# Patient Record
Sex: FEMALE | Race: WHITE | URBAN - METROPOLITAN AREA
[De-identification: names, ages, dates, MRNs, and addresses within clinical notes are randomized per-mention and may not be internally consistent; named-entity substitution may affect disease eponyms.]

---

## 2019-02-27 ENCOUNTER — HOSPITAL ENCOUNTER (OUTPATIENT)
Age: 29
Setting detail: SPECIMEN
Discharge: HOME | End: 2019-02-27
Payer: COMMERCIAL

## 2019-03-01 LAB — H PYLORI BREATH TEST: NEGATIVE

## 2019-07-26 ENCOUNTER — HOSPITAL ENCOUNTER (OUTPATIENT)
Age: 29
Setting detail: SPECIMEN
Discharge: HOME | End: 2019-07-26
Payer: COMMERCIAL

## 2019-07-27 LAB
DIRECT EXAM: NORMAL
Lab: NORMAL
SPECIMEN DESCRIPTION: NORMAL

## 2019-07-30 LAB
CHLAMYDIA BY THIN PREP: NEGATIVE
CYTOLOGY REPORT: NORMAL
HPV SAMPLE: NORMAL
HPV, GENOTYPE 16: NOT DETECTED
HPV, GENOTYPE 18: NOT DETECTED
HPV, HIGH RISK OTHER: NOT DETECTED
HPV, INTERPRETATION: NORMAL
N. GONORRHOEAE DNA, THIN PREP: NEGATIVE
SPECIMEN DESCRIPTION: NORMAL
SPECIMEN DESCRIPTION: NORMAL

## 2019-09-18 ENCOUNTER — HOSPITAL ENCOUNTER (OUTPATIENT)
Age: 29
Setting detail: SPECIMEN
Discharge: HOME | End: 2019-09-18
Payer: COMMERCIAL

## 2019-09-18 LAB
ESTRADIOL LEVEL: 43 PG/ML (ref 27–314)
FOLLICLE STIMULATING HORMONE: 5.7 U/L (ref 1.7–21.5)
LH: 5.2 U/L (ref 1–95.6)
TESTOSTERONE TOTAL: 8 NG/DL (ref 20–70)

## 2019-09-19 LAB — DHEAS (DHEA SULFATE): 62.4 UG/DL (ref 65–380)

## 2019-11-26 ENCOUNTER — HOSPITAL ENCOUNTER (OUTPATIENT)
Age: 29
Setting detail: SPECIMEN
Discharge: HOME | End: 2019-11-26
Payer: COMMERCIAL

## 2019-11-27 LAB
C TRACH DNA GENITAL QL NAA+PROBE: NEGATIVE
DIRECT EXAM: ABNORMAL
HSV 1, NAAT: NEGATIVE
HSV 2, NAAT: NEGATIVE
Lab: ABNORMAL
N. GONORRHOEAE DNA: NEGATIVE
SPECIMEN DESCRIPTION: ABNORMAL
SPECIMEN DESCRIPTION: NORMAL

## 2020-09-10 VITALS — BODY MASS INDEX: 32.28 KG/M2 | HEIGHT: 66 IN

## 2020-09-10 RX ORDER — MELATONIN 100 %
POWDER (GRAM) MISCELLANEOUS
COMMUNITY
End: 2020-09-28 | Stop reason: SDUPTHER

## 2020-09-28 ENCOUNTER — HOSPITAL ENCOUNTER (OUTPATIENT)
Age: 30
Setting detail: SPECIMEN
Discharge: HOME | End: 2020-09-28
Payer: COMMERCIAL

## 2020-09-28 ENCOUNTER — OFFICE VISIT (OUTPATIENT)
Dept: OBGYN CLINIC | Age: 30
End: 2020-09-28
Payer: COMMERCIAL

## 2020-09-28 VITALS
BODY MASS INDEX: 41.14 KG/M2 | DIASTOLIC BLOOD PRESSURE: 82 MMHG | SYSTOLIC BLOOD PRESSURE: 121 MMHG | WEIGHT: 256 LBS | HEIGHT: 66 IN

## 2020-09-28 PROCEDURE — 99213 OFFICE O/P EST LOW 20 MIN: CPT | Performed by: NURSE PRACTITIONER

## 2020-09-28 RX ORDER — QUETIAPINE FUMARATE 25 MG/1
75 TABLET, FILM COATED ORAL
COMMUNITY
Start: 2019-07-26

## 2020-09-28 RX ORDER — VENLAFAXINE HYDROCHLORIDE 150 MG/1
CAPSULE, EXTENDED RELEASE ORAL
COMMUNITY
Start: 2020-08-30

## 2020-09-28 RX ORDER — ATORVASTATIN CALCIUM 10 MG/1
TABLET, FILM COATED ORAL
COMMUNITY
Start: 2020-06-25

## 2020-09-28 RX ORDER — FAMOTIDINE 20 MG/1
TABLET, FILM COATED ORAL
COMMUNITY
Start: 2020-09-20 | End: 2022-09-20 | Stop reason: ALTCHOICE

## 2020-09-28 RX ORDER — CHOLECALCIFEROL (VITAMIN D3) 125 MCG
CAPSULE ORAL
COMMUNITY
Start: 2020-09-14 | End: 2021-08-30 | Stop reason: DRUGHIGH

## 2020-09-28 RX ORDER — BUSPIRONE HYDROCHLORIDE 5 MG/1
TABLET ORAL
COMMUNITY
Start: 2020-09-04

## 2020-09-28 RX ORDER — CARIPRAZINE 1.5 MG/1
CAPSULE, GELATIN COATED ORAL
COMMUNITY
Start: 2020-09-12 | End: 2020-12-10

## 2020-09-28 RX ORDER — VENLAFAXINE HYDROCHLORIDE 75 MG/1
CAPSULE, EXTENDED RELEASE ORAL
COMMUNITY
Start: 2020-09-23

## 2020-09-28 RX ORDER — ASPIRIN 81 MG/1
TABLET, COATED ORAL
COMMUNITY
Start: 2020-09-20

## 2020-09-28 NOTE — PROGRESS NOTES
PROBLEM VISIT     Date of service: 2020    Tony Cleary  Is a 30 y.o.  female    PT's PCP is: Hero Rizo MD     : 1990                                             Subjective:       Patient's last menstrual period was 2020 (approximate).    OB History    Para Term  AB Living   2 2 2     2   SAB TAB Ectopic Molar Multiple Live Births             2      # Outcome Date GA Lbr Holden/2nd Weight Sex Delivery Anes PTL Lv   2 Term 08 40w0d   F Vag-Spont EPI  HEIDE   1 Term  40w0d       HEIDE        Social History     Tobacco Use   Smoking Status Never Smoker   Smokeless Tobacco Never Used        Social History     Substance and Sexual Activity   Alcohol Use No       Allergies: No known allergies      Current Outpatient Medications:     ASPIRIN LOW DOSE 81 MG EC tablet, TAKE 1 TABLET BY MOUTH EVERY DAY, Disp: , Rfl:     atorvastatin (LIPITOR) 10 MG tablet, TAKE 1 TABLET BY MOUTH EVERY DAY, Disp: , Rfl:     busPIRone (BUSPAR) 5 MG tablet, TAKE 1 TABLET BY MOUTH THREE TIMES A DAY, Disp: , Rfl:     famotidine (PEPCID) 20 MG tablet, TAKE 1 TABLET BY MOUTH TWICE A DAY, Disp: , Rfl:     melatonin 5 MG TABS tablet, TAKE 2 TABLETS BY MOUTH AT BEDTIME, Disp: , Rfl:     VRAYLAR 1.5 MG capsule, TAKE 1 CAPSULE BY MOUTH EVERY DAY, Disp: , Rfl:     QUEtiapine (SEROQUEL) 25 MG tablet, QUEtiapine SEROquel 25MG Oral Tablet 2019 Provider: 2019  [id number] ([id number]), Disp: , Rfl:     venlafaxine (EFFEXOR XR) 150 MG extended release capsule, TAKE 1 CAPSULE EVERY MORNING, Disp: , Rfl:     venlafaxine (EFFEXOR XR) 75 MG extended release capsule, TAKE 1 CAPSULE BY MOUTH EVERY DAY, Disp: , Rfl:     Cariprazine HCl (VRAYLAR PO), Take by mouth, Disp: , Rfl:     raNITIdine HCl ([facility]S RANITIDINE PO), Take by mouth, Disp: , Rfl:     QUEtiapine Fumarate (SEROQUEL PO), Take by mouth, Disp: , Rfl:     Social History     Substance and Sexual Activity   Sexual Activity Yes    Partners: Male       Last Yearly:  8/2020    Last pap: 2019      Chief Complaint   Patient presents with    Follow-up     Needs pap by PCP reccomendations- was not done at annual in August.         PE:  Vital Signs  Blood pressure 121/82, height 5' 6\" (1.676 m), weight 256 lb (116.1 kg), last menstrual period 09/07/2020. 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 This was not reported as part of her history prior. Patient now desires yearly paps, will make note. Desires STD testing. PT denies fever, chills, nausea and vomiting       Review of Systems   Genitourinary: Negative. Objective   Pelvic Exam: GENITAL/URINARY:  External Genitalia:  General appearance; normal, Hair distribution; normal, Lesions absent  Urethra: Fullness absent, Masses absent  Vagina:  General appearance normal, Estrogen effect normal, Discharge absent, Lesions absent, Pelvic support normal  Cervix:  General appearance normal, Lesions absent, Discharge absent, Tenderness absent, Enlargement absent, Nodularity absent, friable                                     Vaginal discharge: no vaginal discharge                        Results reviewed today:    No results found for this visit on 09/28/20. Assessment and Plan          Diagnosis Orders   1. Encounter for gynecological examination (general) (routine) without abnormal findings  PAP SMEAR   2. Screening examination for STD (sexually transmitted disease)  Chlamydia/GC DNA, Thin Prep    VAGINITIS DNA PROBE   3. 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 PCP recommended yearly paps due to increased risk of cancer. I am having Codie CAMARAErasto Evy maintain her Cariprazine HCl (VRAYLAR PO), raNITIdine HCl (CVS RANITIDINE PO), QUEtiapine Fumarate (SEROQUEL PO), Aspirin Low Dose, atorvastatin, busPIRone, famotidine, melatonin, Vraylar, QUEtiapine, venlafaxine, and venlafaxine.     Return in about 11 months (around 8/28/2021) for yearly. There are no Patient Instructions on file for this visit. Over 50% of time spent on counseling and care coordination on: see assessment and plan,  She was also counseled on her preventative health maintenance recommendations and follow-up.         FF time: 15 min      Jaison Caballero,9/28/2020 2:18 PM

## 2020-09-29 LAB
DIRECT EXAM: ABNORMAL
Lab: ABNORMAL
SPECIMEN DESCRIPTION: ABNORMAL

## 2020-09-29 RX ORDER — FLUCONAZOLE 150 MG/1
150 TABLET ORAL ONCE
Qty: 1 TABLET | Refills: 0 | Status: SHIPPED | OUTPATIENT
Start: 2020-09-29 | End: 2020-09-29

## 2020-10-01 LAB
CHLAMYDIA BY THIN PREP: NEGATIVE
N. GONORRHOEAE DNA, THIN PREP: NEGATIVE
SPECIMEN DESCRIPTION: NORMAL

## 2020-10-06 LAB — CYTOLOGY REPORT: NORMAL

## 2020-10-21 ENCOUNTER — TELEPHONE (OUTPATIENT)
Dept: OBGYN CLINIC | Age: 30
End: 2020-10-21

## 2020-10-21 RX ORDER — FLUCONAZOLE 150 MG/1
150 TABLET ORAL ONCE
Qty: 1 TABLET | Refills: 0 | Status: SHIPPED | OUTPATIENT
Start: 2020-10-21 | End: 2020-10-21

## 2020-10-21 NOTE — TELEPHONE ENCOUNTER
Pt calling with continued yeast infection sx. Wondered if you'd be willing send Diflucan in for her. Seeing urologist today to deal with those sx.

## 2020-12-10 ENCOUNTER — OFFICE VISIT (OUTPATIENT)
Dept: OBGYN CLINIC | Age: 30
End: 2020-12-10
Payer: COMMERCIAL

## 2020-12-10 ENCOUNTER — HOSPITAL ENCOUNTER (OUTPATIENT)
Age: 30
Setting detail: SPECIMEN
Discharge: HOME | End: 2020-12-10
Payer: COMMERCIAL

## 2020-12-10 VITALS
HEIGHT: 66 IN | BODY MASS INDEX: 39.21 KG/M2 | SYSTOLIC BLOOD PRESSURE: 118 MMHG | WEIGHT: 244 LBS | DIASTOLIC BLOOD PRESSURE: 80 MMHG

## 2020-12-10 PROCEDURE — 99213 OFFICE O/P EST LOW 20 MIN: CPT | Performed by: OBSTETRICS & GYNECOLOGY

## 2020-12-10 PROCEDURE — G8484 FLU IMMUNIZE NO ADMIN: HCPCS | Performed by: OBSTETRICS & GYNECOLOGY

## 2020-12-10 PROCEDURE — 1036F TOBACCO NON-USER: CPT | Performed by: OBSTETRICS & GYNECOLOGY

## 2020-12-10 PROCEDURE — G8427 DOCREV CUR MEDS BY ELIG CLIN: HCPCS | Performed by: OBSTETRICS & GYNECOLOGY

## 2020-12-10 PROCEDURE — G8417 CALC BMI ABV UP PARAM F/U: HCPCS | Performed by: OBSTETRICS & GYNECOLOGY

## 2020-12-10 RX ORDER — OXYBUTYNIN CHLORIDE 10 MG/1
15 TABLET, EXTENDED RELEASE ORAL DAILY
COMMUNITY
Start: 2020-11-28 | End: 2021-08-30 | Stop reason: DRUGHIGH

## 2020-12-10 RX ORDER — FLUCONAZOLE 150 MG/1
150 TABLET ORAL
Qty: 2 TABLET | Refills: 0 | Status: SHIPPED | OUTPATIENT
Start: 2020-12-10 | End: 2020-12-16

## 2020-12-10 NOTE — PROGRESS NOTES
DATE OF VISIT:  12/10/20    PATIENT NAME:  Claudetta Grant     YOB: 1990    REASON FOR VISIT:    Chief Complaint   Patient presents with    Vaginitis     Pt. c/o vaginal burning, white discharge, and itching internally and externally. Symptoms began approx 1 week ago. HISTORY OF PRESENT ILLNESS:  Pt here with complaint of burning, itching, and thick, clumpy, white discharge - states  is concerned bc he has symptoms after having intercourse; he thinks maybe she has trich -reviewed previous cultures with pt - she has not had this before      No LMP recorded. Vitals:    12/10/20 1332   BP: 118/80   Site: Right Upper Arm   Position: Sitting   Weight: 244 lb (110.7 kg)   Height: 5' 6\" (1.676 m)     Body mass index is 39.38 kg/m². Allergies   Allergen Reactions    No Known Allergies      Current Outpatient Medications   Medication Sig Dispense Refill    ASPIRIN LOW DOSE 81 MG EC tablet TAKE 1 TABLET BY MOUTH EVERY DAY      atorvastatin (LIPITOR) 10 MG tablet TAKE 1 TABLET BY MOUTH EVERY DAY      busPIRone (BUSPAR) 5 MG tablet TAKE 1 TABLET BY MOUTH THREE TIMES A DAY      famotidine (PEPCID) 20 MG tablet TAKE 1 TABLET BY MOUTH TWICE A DAY      melatonin 5 MG TABS tablet TAKE 2 TABLETS BY MOUTH AT BEDTIME      QUEtiapine (SEROQUEL) 25 MG tablet QUEtiapine SEROquel 25MG Oral Tablet 07/26/2019 Provider: 07-  [facility] ([id number])      venlafaxine (EFFEXOR XR) 150 MG extended release capsule TAKE 1 CAPSULE EVERY MORNING      venlafaxine (EFFEXOR XR) 75 MG extended release capsule TAKE 1 CAPSULE BY MOUTH EVERY DAY      Cariprazine HCl (VRAYLAR PO) Take by mouth      raNITIdine HCl (CVS RANITIDINE PO) Take by mouth      oxybutynin (DITROPAN-XL) 10 MG extended release tablet        No current facility-administered medications for this visit.       Social History     Socioeconomic History    Marital status: Single     Spouse name: Not on file    Number of children: Not on file    Years of education: Not on file    Highest education level: Not on file   Occupational History    Not on file   Social Needs    Financial resource strain: Not on file    Food insecurity     Worry: Not on file     Inability: Not on file    Transportation needs     Medical: Not on file     Non-medical: Not on file   Tobacco Use    Smoking status: Never Smoker    Smokeless tobacco: Never Used   Substance and Sexual Activity    Alcohol use: No    Drug use: Yes     Types: Marijuana     Comment: Last use 9/2020    Sexual activity: Yes     Partners: Male   Lifestyle    Physical activity     Days per week: Not on file     Minutes per session: Not on file    Stress: Not on file   Relationships    Social connections     Talks on phone: Not on file     Gets together: Not on file     Attends Congregational service: Not on file     Active member of club or organization: Not on file     Attends meetings of clubs or organizations: Not on file     Relationship status: Not on file    Intimate partner violence     Fear of current or ex partner: Not on file     Emotionally abused: Not on file     Physically abused: Not on file     Forced sexual activity: Not on file   Other Topics Concern    Not on file   Social History Narrative    Not on file       REVIEW OF SYSTEMS:  Review of Systems   Constitutional: Negative for chills and fever. Genitourinary: Positive for vaginal discharge and vaginal pain. Negative for dyspareunia and dysuria. PHYSICAL EXAM:  /80 (Site: Right Upper Arm, Position: Sitting)   Ht 5' 6\" (1.676 m)   Wt 244 lb (110.7 kg)   BMI 39.38 kg/m²   Physical Exam  Constitutional:       Appearance: Normal appearance. Genitourinary:      Pelvic exam was performed with patient in the lithotomy position. Vaginal discharge (thick white) and erythema present. HENT:      Head: Normocephalic and atraumatic.    Eyes:      Extraocular Movements: Extraocular movements intact. Pupils: Pupils are equal, round, and reactive to light. Neck:      Musculoskeletal: Normal range of motion. Cardiovascular:      Rate and Rhythm: Normal rate. Pulmonary:      Effort: Pulmonary effort is normal.   Musculoskeletal: Normal range of motion. Neurological:      Mental Status: She is alert and oriented to person, place, and time. Skin:     General: Skin is warm and dry. Psychiatric:         Mood and Affect: Mood normal.         Behavior: Behavior normal.         Thought Content: Thought content normal.         Judgment: Judgment normal.         ASSESSMENT:      1. Acute vaginitis        PLAN:  No orders of the defined types were placed in this encounter. No follow-ups on file.        Electronically signed by Bright Andrade DO on 12/10/20

## 2020-12-11 LAB
DIRECT EXAM: ABNORMAL
Lab: ABNORMAL
SPECIMEN DESCRIPTION: ABNORMAL

## 2020-12-14 RX ORDER — METRONIDAZOLE 500 MG/1
500 TABLET ORAL 2 TIMES DAILY
Qty: 14 TABLET | Refills: 0 | Status: SHIPPED | OUTPATIENT
Start: 2020-12-14 | End: 2020-12-21

## 2020-12-14 RX ORDER — FLUCONAZOLE 150 MG/1
150 TABLET ORAL ONCE
Qty: 1 TABLET | Refills: 0 | Status: SHIPPED | OUTPATIENT
Start: 2020-12-14 | End: 2020-12-14

## 2020-12-14 NOTE — RESULT ENCOUNTER NOTE
I attempted to call patient with her results and her phone has calling restrictions. The call from the office will not go through. Rxs e-prescribed to the pharmacy. Patient has MyChart and can see her results and pharmacy should notify patient once Rxs are available.

## 2021-05-25 ENCOUNTER — INITIAL CONSULT (OUTPATIENT)
Dept: PULMONOLOGY | Age: 31
End: 2021-05-25
Payer: COMMERCIAL

## 2021-05-25 VITALS
WEIGHT: 253 LBS | SYSTOLIC BLOOD PRESSURE: 118 MMHG | TEMPERATURE: 97.8 F | HEIGHT: 66 IN | OXYGEN SATURATION: 98 % | DIASTOLIC BLOOD PRESSURE: 84 MMHG | BODY MASS INDEX: 40.66 KG/M2 | HEART RATE: 110 BPM

## 2021-05-25 DIAGNOSIS — E66.01 MORBID OBESITY WITH BMI OF 40.0-44.9, ADULT (HCC): ICD-10-CM

## 2021-05-25 DIAGNOSIS — R06.81 WITNESSED APNEIC SPELLS: ICD-10-CM

## 2021-05-25 DIAGNOSIS — R06.89 SLEEP RELATED CHOKING SENSATION: ICD-10-CM

## 2021-05-25 DIAGNOSIS — G47.10 HYPERSOMNIA: Primary | ICD-10-CM

## 2021-05-25 PROCEDURE — G8417 CALC BMI ABV UP PARAM F/U: HCPCS | Performed by: INTERNAL MEDICINE

## 2021-05-25 PROCEDURE — 1036F TOBACCO NON-USER: CPT | Performed by: INTERNAL MEDICINE

## 2021-05-25 PROCEDURE — G8427 DOCREV CUR MEDS BY ELIG CLIN: HCPCS | Performed by: INTERNAL MEDICINE

## 2021-05-25 PROCEDURE — 99203 OFFICE O/P NEW LOW 30 MIN: CPT | Performed by: INTERNAL MEDICINE

## 2021-05-25 NOTE — PATIENT INSTRUCTIONS
Patient Education        Learning About Low-Carbohydrate Diets  What is a low-carbohydrate diet? A low-carbohydrate (or \"low-carb\") diet limits foods and drinks that have carbohydrates. This includes grains, fruits, milk and yogurt, and starchy vegetables like potatoes, beans, and corn. It also avoids foods and drinks that have added sugar. Instead, low-carb diets include foods that are high in protein and fat. Why might you follow a low-carb diet? Low-carb diets may be used for a variety of reasons, such as for weight loss. People who have diabetes may use a low-carb diet to help manage their blood sugar levels. What should you do before you start the diet? Talk to your doctor before you try any diet. This is even more important if you have health problems like kidney disease, heart disease, or diabetes. Your doctor may suggest that you meet with a registered dietitian. A dietitian can help you make an eating plan that works for you. What foods do you eat on a low-carb diet? On a low-carb diet, you choose foods that are high in protein and fat. Examples of these are:  · Meat, poultry, and fish. · Eggs. · Nuts, such as walnuts, pecans, almonds, and peanuts. · Peanut butter and other nut butters. · Tofu. · Avocado. · Afshan Tariq. · Non-starchy vegetables like broccoli, cauliflower, green beans, mushrooms, peppers, lettuce, and spinach. · Unsweetened non-dairy milks like almond milk and coconut milk. · Cheese, cottage cheese, and cream cheese. Current as of: December 17, 2020               Content Version: 12.8  © 2006-2021 [employer]. Care instructions adapted under license by [facility] ([facility]). If you have questions about a medical condition or this instruction, always ask your healthcare professional. [address]  any warranty or liability for your use of this information.          Patient Education        Sleep Apnea: Care Instructions  Overview     Sleep apnea means that you frequently stop breathing for 10 seconds or longer during sleep. It can be mild to severe, based on the number of times an hour that you stop breathing or have slowed breathing. Blocked or narrowed airways in your nose, mouth, or throat can cause sleep apnea. Your airway can become blocked when your throat muscles and tongue relax during sleep. You can help treat sleep apnea at home by making lifestyle changes. You also can use a CPAP breathing machine that keeps tissues in the throat from blocking your airway. Or your doctor may suggest that you use a breathing device while you sleep. It helps keep your airway open. This could be a device that you put in your mouth. In some cases, surgery may be needed to remove enlarged tissues in the throat. Follow-up care is a key part of your treatment and safety. Be sure to make and go to all appointments, and call your doctor if you are having problems. It's also a good idea to know your test results and keep a list of the medicines you take. How can you care for yourself at home? · Lose weight, if needed. · Sleep on your side. It may help mild apnea. · Avoid alcohol and medicines such as sleeping pills, opioids, or sedatives before bed. · Don't smoke. If you need help quitting, talk to your doctor. · Prop up the head of your bed. · Treat breathing problems, such as a stuffy nose, that are caused by a cold or allergies. · Try a continuous positive airway pressure (CPAP) breathing machine if your doctor recommends it. · If CPAP doesn't work for you, ask your doctor if you can try other masks, settings, or breathing machines. · Try oral breathing devices or other nasal devices. · Talk to your doctor if your nose feels dry or bleeds, or if it gets runny or stuffy when you use a breathing machine. · Tell your doctor if you're sleepy during the day and it affects your daily life. Don't drive or operate machinery when you're drowsy.   When should you call for help? Watch closely for changes in your health, and be sure to contact your doctor if:    · You still have sleep apnea even though you have made lifestyle changes.     · You are thinking of trying a device such as CPAP.     · You are having problems using a CPAP or similar machine.     · You are still sleepy during the day, and it affects your daily life. Where can you learn more? Go to https://[email or web address]. org and sign in to your [email or web address] account. Enter W769 in the [employer] box to learn more about \"Sleep Apnea: Care Instructions. \"     If you do not have an account, please click on the \"Sign Up Now\" link. Current as of: October 26, 2020               Content Version: 12.8  © 2006-2021 [employer]. Care instructions adapted under license by [facility] ([facility]). If you have questions about a medical condition or this instruction, always ask your healthcare professional. [address] any warranty or liability for your use of this information.

## 2021-05-25 NOTE — PROGRESS NOTES
New Sleep Patient H/P    Presentation:  Diana Baez is referred by Yudy Bee CNP for  TARA    Codie's boyfriend reports periods sleep apnea and loud disruptive snoring, Codie endorses fragmented sleep, wakes not refreshed, she is unemployed and has a delayed phase sleep, morbidly obese, reports waking up choking, daytime somnolence, falls sleep at inappropriate places. H/O Bipolar disorder on Cariprazine, Venlafaxine, Quetiapine  H/O Pabon syndrome with MHL1 mutation she is doing do diligence following recommendations due to increase likelihood of malignancies at early age. Time in Bed:   Bedtime: 2 a.m. Awakens  1 p.m. Different on weekends? No       Codie falls asleep in 60  minutes. Any awakenings? Yes  Difficulty Falling back to sleep? Yes  Symptoms began:  several years ago. Symptoms include: snoring, choking, gasping, periods of not breathing, excessive daytime sleepiness, falling asleep while reading, watching television, disrupted sleep, naps    Previous evaluation and treatment? No     She denies any history of sleep walking or sleep talking. No history of seizures activity. No history suggestive of restless legs syndrome. No history of bruxism. No history of head injury. Naps:  Any naps? No     Snoring and Apneas:  Do you snore or been told you a snore? Yes  How long have known about your snoring? years  Any witnessed apneas? Yes  Any awakenings with choking or gasping? Yes    Dreams:  Any recurring dreams? No  Hallucinations? No  Sleep Paralysis? No  Symptoms of Cataplexy? No    Driving History:    Does not drive. Weight:  Any change in weight over the past year? Yes  How about past 5 years? Yes  How much? 50 lbs    Other Compliants :Codie complains of decreased memory, decreased concentration as well.     Past Medical History:   Diagnosis Date    Anxiety     Depression     Herpes simplex virus (HSV) infection     Pabon syndrome Past Surgical History:   Procedure Laterality Date    TUBAL LIGATION  2016    TYMPANOSTOMY TUBE PLACEMENT         Social History     Tobacco Use    Smoking status: Never Smoker    Smokeless tobacco: Never Used   Vaping Use    Vaping Use: Never used   Substance Use Topics    Alcohol use: No    Drug use: Yes     Types: Marijuana     Comment: Last use 9/2020       Allergies   Allergen Reactions    No Known Allergies        Current Outpatient Medications   Medication Sig Dispense Refill    oxybutynin (DITROPAN-XL) 10 MG extended release tablet       ASPIRIN LOW DOSE 81 MG EC tablet TAKE 1 TABLET BY MOUTH EVERY DAY      atorvastatin (LIPITOR) 10 MG tablet TAKE 1 TABLET BY MOUTH EVERY DAY      busPIRone (BUSPAR) 5 MG tablet TAKE 1 TABLET BY MOUTH THREE TIMES A DAY      famotidine (PEPCID) 20 MG tablet TAKE 1 TABLET BY MOUTH TWICE A DAY      melatonin 5 MG TABS tablet TAKE 2 TABLETS BY MOUTH AT BEDTIME      QUEtiapine (SEROQUEL) 25 MG tablet QUEtiapine SEROquel 25MG Oral Tablet 07/26/2019 Provider: 07-  [facility] ([id number])      venlafaxine (EFFEXOR XR) 150 MG extended release capsule TAKE 1 CAPSULE EVERY MORNING      venlafaxine (EFFEXOR XR) 75 MG extended release capsule TAKE 1 CAPSULE BY MOUTH EVERY DAY      Cariprazine HCl (VRAYLAR PO) Take by mouth      raNITIdine HCl (CVS RANITIDINE PO) Take by mouth       No current facility-administered medications for this visit. Family History   Problem Relation Age of Onset    Depression Mother     Cancer Mother     High Blood Pressure Father     High Blood Pressure Maternal Aunt     Stroke Maternal Grandmother     Cancer Maternal Grandmother     Cancer Paternal Grandmother         Any family history of any sleep problems or any one in your family on CPAP?  No    Social History     Tobacco Use    Smoking status: Never Smoker    Smokeless tobacco: Never Used   Vaping Use    Vaping Use: Never used   Substance Use Topics    Alcohol use: No    Drug use: Yes     Types: Marijuana     Comment: Last use 9/2020     Caffeine Intake: How much soda (pop), coffee, tea, power drinks do you ingest per day? 3 per day. Employment History:  Where do you work? Unemployed      Review of Systems:   General/Constitutional: No recent loss of weight or appetite changes. No fever or chills. HENT: Negative. Eyes: Negative. Upper respiratory tract: No nasal stuffiness or post nasal drip. Lower respiratory tract/ lungs: No cough or sputum production. No hemoptysis. Cardiovascular: No palpitations or chest pain. Gastrointestinal: No nausea or vomiting. Neurological: No focal neurologiacal weakness. Extremities: No edema. Musculoskeletal: No complaints. Genitourinary: No complaints. Hematological: Negative. Psychiatric/Behavioral: Negative. Skin: No itching. Physical Exam:    HEIGHTHeight: 5' 6\" (167.6 cm) WEIGHTWeight: 253 lb (114.8 kg)    BMI:  40 Neck Size: 16.5  Oxygen Sat: room air    SAQLI: 38 ESS: 10   Vitals: Bettey Grad Vitals:    05/25/21 1325   BP: 118/84   Pulse: 110   Temp: 97.8 °F (36.6 °C)   SpO2: 98%       Physical Exam :  Constitutional: BMI 40  HENT:   Head: Normocephalic and atraumatic. Mouth/Throat: Oropharynx is clear and moist. No oral thrush. large tongue, crowded pharynx, mallampati class 3 . Eyes: Conjunctivae are normal. PERRLA. No scleral icterus. Neck: Neck supple. No JVD present. No tracheal deviation present. Circumference 16.5 inches  Cardiovascular: Normal rate, regular rhythm, normal heart sounds. No murmur heard. Pulmonary/Chest: Effort normal and breath sounds normal. No stridor. No respiratory distress. No wheezes. No rales. Abdominal: Soft. No distension. No tenderness. Musculoskeletal: Normal range of motion. Lymphadenopathy:  No cervical adenopathy. Neurological: Alert and oriented to person, place, and time. No focal deficits. Skin: Skin is warm and dry. Patient is not diaphoretic. Psychiatric: Normal behavior with normal mood and affect. Diagnostic Data:    Assessment    Diagnosis Orders   1. Hypersomnia  Baseline Diagnostic Sleep Study   2. Witnessed apneic spells  Baseline Diagnostic Sleep Study   3. Sleep related choking sensation  Baseline Diagnostic Sleep Study   4. Morbid obesity with BMI of 40.0-44.9, adult ([facility] [address]  Baseline Diagnostic Sleep Study         Plan   Orders Placed This Encounter   Procedures    Baseline Diagnostic Sleep Study     Standing Status:   Future     Standing Expiration Date:   5/25/2022     Order Specific Question:   Adult or Pediatric     Answer:   Adult Study (>7 Years)     Order Specific Question:   Location For Sleep Study     Answer:   [facility]     Order Specific Question:   Select Sleep Lab Location     Answer:   [address]     Mask Desensitization and Pre study teaching? No  Weight Loss Information Given? Yes  Sleep Hygiene Discussed? Yes    -She was advised to call [employer] regarding supplies if needed.  -She call my office for earlier appointment if needed for worsening of sleep symptoms.  -Manjit Redman educated about my impression and plan. Patient verbalizes understanding.

## 2021-07-25 ENCOUNTER — HOSPITAL ENCOUNTER (OUTPATIENT)
Dept: SLEEP CENTER | Age: 31
Discharge: HOME | End: 2021-07-27
Payer: COMMERCIAL

## 2021-07-25 DIAGNOSIS — R06.89 SLEEP RELATED CHOKING SENSATION: ICD-10-CM

## 2021-07-25 DIAGNOSIS — R06.81 WITNESSED APNEIC SPELLS: ICD-10-CM

## 2021-07-25 DIAGNOSIS — G47.10 HYPERSOMNIA: ICD-10-CM

## 2021-07-25 DIAGNOSIS — E66.01 MORBID OBESITY WITH BMI OF 40.0-44.9, ADULT (HCC): ICD-10-CM

## 2021-07-25 PROCEDURE — 95810 POLYSOM 6/> YRS 4/> PARAM: CPT

## 2021-07-26 LAB — STATUS: NORMAL

## 2021-07-27 ENCOUNTER — TELEPHONE (OUTPATIENT)
Dept: OBGYN CLINIC | Age: 31
End: 2021-07-27

## 2021-07-27 RX ORDER — FLUCONAZOLE 150 MG/1
150 TABLET ORAL ONCE
Qty: 1 TABLET | Refills: 0 | Status: SHIPPED | OUTPATIENT
Start: 2021-07-27 | End: 2021-07-27

## 2021-08-09 NOTE — PROGRESS NOTES
[address] [place] [address]                               SLEEP STUDY REPORT    PATIENT NAME: Matt Biswas                  :        1990  MED REC NO:   [id number]                           ROOM:  ACCOUNT NO:   [de-identified]                           ADMIT DATE: 2021  PROVIDER:     KJ Almeida Shirley STUDY:  2021    DIAGNOSTIC POLYSOMNOGRAM REPORT    REFERRING PROVIDER:  Lilian Bonilla CNP    HISTORY OF PRESENT ILLNESS:  The patient is a 32-year-old female who  complains of insomnia and nonrestorative sleep. Her weight is 253  pounds, height 66 inches, BMI 40.8. METHODS:  The patient underwent digital polysomnography in compliance  with the standards and specifications from the AASM Manual including the  simultaneous recording of 3 EEG channels (F4-M1, C4-M1, and O2-M1 with  back up electrodes F3-M2, C3-M2, and O1-M2), 2 EOG channels (E1-M2, and  E2-M1,), EMG (chin, left & right leg), EKG, Nonin pulse oximetry with   less than 2 second averaging time, body position, airflow recorded by  oral-nasal thermal sensor and nasal air pressure transducer, plus  respiratory effort recorded by calibrated respiratory inductance  plethysmography (RIP), flow volume loop, sound and video. Sleep staging  and scoring followed the standard put forth by the American Academy of  Sleep Medicine and utilized the 1B obstructive hypopnea event  desaturation of 4 percent or greater. DETAILS OF THE STUDY:  Lights out 10:02 p.m., lights on 05:26 a.m. Total recording time 443 minutes, sleep period time 409 minutes, total  sleep time 398 minutes, sleep efficiency 89.9%. Latency to sleep 33  minutes, wake after sleep onset 11 minutes. Latency to  minutes.     SLEEP STAGING:  The patient slept 4.5 minutes or 1.1% of total sleep  time in N1 sleep, 271 minutes or 68% in N2 sleep, 61 minutes or 15.4% in  N3 sleep, 61 minutes or 15.4% in REM sleep. RESPIRATORY SUMMARY:  1 obstructive apnea, 1 obstructive hypopnea for an  AHI of 0.3. The supine AHI was 0. BODY POSITION SUMMARY:  230 minutes of supine sleep, 15.7 minutes of  left lateral sleep, 152 minutes of right lateral sleep. SLEEP DISTURBANCE SUMMARY:  There were 82 arousals for an arousal index  of 12.3, all of the arousals were spontaneous in nature. PERIODIC LIMB MOVEMENT SUMMARY:  There was none. PULSE OXIMETRY SUMMARY:  Mean oxygen saturation during wakefulness 95%,  during sleep 94.7%. Lowest oxygen saturation 90%. ECG SUMMARY:  Normal sinus rhythm. ASSESSMENT:  Negative polysomnogram for sleep-disordered breathing with  an AHI of 0.3. Normal overnight pulse oximetry and ECG monitoring. No  evidence of movement disorder. Exhibiting a sleep efficiency of 90%. RECOMMENDATIONS:  The patient will be followed up in the sleep clinic to  review results and plan of care.         Jona Colorado M.D.    D: 08/08/2021 12:00:17       T: 08/09/2021 3:13:21     [email or web address]  Job#: [id number]     Doc#: [id number]    CC:

## 2021-08-12 ENCOUNTER — OFFICE VISIT (OUTPATIENT)
Dept: PULMONOLOGY | Age: 31
End: 2021-08-12
Payer: COMMERCIAL

## 2021-08-12 VITALS
HEIGHT: 66 IN | DIASTOLIC BLOOD PRESSURE: 68 MMHG | BODY MASS INDEX: 40.24 KG/M2 | SYSTOLIC BLOOD PRESSURE: 128 MMHG | HEART RATE: 108 BPM | OXYGEN SATURATION: 98 % | WEIGHT: 250.4 LBS | TEMPERATURE: 98 F

## 2021-08-12 DIAGNOSIS — G47.09 OTHER INSOMNIA: ICD-10-CM

## 2021-08-12 DIAGNOSIS — F31.9 BIPOLAR 1 DISORDER (HCC): ICD-10-CM

## 2021-08-12 DIAGNOSIS — E66.01 MORBID OBESITY WITH BMI OF 40.0-44.9, ADULT (HCC): ICD-10-CM

## 2021-08-12 DIAGNOSIS — G47.10 HYPERSOMNIA: Primary | ICD-10-CM

## 2021-08-12 PROCEDURE — 1036F TOBACCO NON-USER: CPT | Performed by: PHYSICIAN ASSISTANT

## 2021-08-12 PROCEDURE — G8417 CALC BMI ABV UP PARAM F/U: HCPCS | Performed by: PHYSICIAN ASSISTANT

## 2021-08-12 PROCEDURE — G8427 DOCREV CUR MEDS BY ELIG CLIN: HCPCS | Performed by: PHYSICIAN ASSISTANT

## 2021-08-12 PROCEDURE — 99214 OFFICE O/P EST MOD 30 MIN: CPT | Performed by: PHYSICIAN ASSISTANT

## 2021-08-12 ASSESSMENT — ENCOUNTER SYMPTOMS
COUGH: 0
ALLERGIC/IMMUNOLOGIC NEGATIVE: 1
NAUSEA: 0
SHORTNESS OF BREATH: 0
EYES NEGATIVE: 1
STRIDOR: 0
CHEST TIGHTNESS: 0
WHEEZING: 0
BACK PAIN: 0
DIARRHEA: 0

## 2021-08-12 NOTE — PROGRESS NOTES
[facility]    Cari Marino, 35 y.o.  [id number]      Pt of [facility]  Nurses Notes   Gorge Vivas is here today for PSG results   Study Results  Initial Study Date -  7/25/2021  AHI -  0.3    TotalEvents - 2  (Apneas  1  Hypopneas 1  Central  0)  LM w/Arousals - 0  Sleep Efficiency - 89.9 % (Total Sleep Time - 398.5 min)  Time with Sats below 88% - 0 min  Interval History       Cari Marino is a 35 y.o. old female who comes in to review the results of her recent sleep study, to answer questions and to explore options for treatment. she continues to have symptoms of snoring, excessive daytime sleepiness, feels sleepy during the day. She sleeps for about 13-14 hours a day. She does not work. She does not exercise   Allergies  Allergies   Allergen Reactions    No Known Allergies      Meds  Current Outpatient Medications   Medication Sig Dispense Refill    oxybutynin (DITROPAN-XL) 10 MG extended release tablet Take 15 mg by mouth daily       ASPIRIN LOW DOSE 81 MG EC tablet TAKE 1 TABLET BY MOUTH EVERY DAY      atorvastatin (LIPITOR) 10 MG tablet TAKE 1 TABLET BY MOUTH EVERY DAY      busPIRone (BUSPAR) 5 MG tablet TAKE 1 TABLET BY MOUTH THREE TIMES A DAY      famotidine (PEPCID) 20 MG tablet TAKE 1 TABLET BY MOUTH TWICE A DAY      melatonin 5 MG TABS tablet TAKE 2 TABLETS BY MOUTH AT BEDTIME      QUEtiapine (SEROQUEL) 25 MG tablet 75 mg       venlafaxine (EFFEXOR XR) 150 MG extended release capsule TAKE 1 CAPSULE EVERY MORNING      venlafaxine (EFFEXOR XR) 75 MG extended release capsule TAKE 1 CAPSULE BY MOUTH EVERY DAY      Cariprazine HCl (VRAYLAR PO) Take by mouth      raNITIdine HCl ([employer] RANITIDINE PO) Take by mouth       No current facility-administered medications for this visit. ROS  Review of Systems   Constitutional: Negative for activity change, appetite change, chills and fever. HENT: Negative for congestion and postnasal drip. Eyes: Negative.

## 2021-08-30 ENCOUNTER — OFFICE VISIT (OUTPATIENT)
Dept: OBGYN CLINIC | Age: 31
End: 2021-08-30
Payer: COMMERCIAL

## 2021-08-30 ENCOUNTER — HOSPITAL ENCOUNTER (OUTPATIENT)
Age: 31
Setting detail: SPECIMEN
Discharge: HOME | End: 2021-08-30
Payer: COMMERCIAL

## 2021-08-30 VITALS — DIASTOLIC BLOOD PRESSURE: 70 MMHG | SYSTOLIC BLOOD PRESSURE: 123 MMHG | WEIGHT: 251 LBS | BODY MASS INDEX: 40.51 KG/M2

## 2021-08-30 DIAGNOSIS — Z15.09 LYNCH SYNDROME: ICD-10-CM

## 2021-08-30 DIAGNOSIS — Z01.419 WOMEN'S ANNUAL ROUTINE GYNECOLOGICAL EXAMINATION: Primary | ICD-10-CM

## 2021-08-30 DIAGNOSIS — Z11.3 SCREENING FOR STD (SEXUALLY TRANSMITTED DISEASE): ICD-10-CM

## 2021-08-30 PROCEDURE — 99395 PREV VISIT EST AGE 18-39: CPT | Performed by: NURSE PRACTITIONER

## 2021-08-30 RX ORDER — ASPIRIN 81 MG/1
81 TABLET ORAL DAILY
COMMUNITY

## 2021-08-30 RX ORDER — OXYBUTYNIN CHLORIDE 15 MG/1
TABLET, EXTENDED RELEASE ORAL
COMMUNITY
Start: 2021-08-25

## 2021-08-30 RX ORDER — CARIPRAZINE 3 MG/1
CAPSULE, GELATIN COATED ORAL
COMMUNITY
Start: 2021-08-27

## 2021-08-30 RX ORDER — QUETIAPINE FUMARATE 50 MG/1
TABLET, FILM COATED ORAL
COMMUNITY
Start: 2021-08-28

## 2021-08-30 NOTE — PROGRESS NOTES
YEARLY PHYSICAL    Date of service: 2021    Juanis Terry  Is a 35 y.o.  female    PT's PCP is: RIC Hardy - LEONARD     : 1990                                             Subjective:       Patient's last menstrual period was 08/15/2021.      Are your menses regular: yes    OB History    Para Term  AB Living   2 2 2     2   SAB TAB Ectopic Molar Multiple Live Births             2      # Outcome Date GA Lbr Holden/2nd Weight Sex Delivery Anes PTL Lv   2 Term 08 40w0d   F Vag-Spont EPI  HEIDE   1 Term  40w0d       HEIDE        Social History     Tobacco Use   Smoking Status Never Smoker   Smokeless Tobacco Never Used        Social History     Substance and Sexual Activity   Alcohol Use No       Family History   Problem Relation Age of Onset    Depression Mother     Cancer Mother     High Blood Pressure Father     High Blood Pressure Maternal Aunt     Stroke Maternal Grandmother     Cancer Maternal Grandmother     Cancer Paternal Grandmother        Any family history of breast or ovarian cancer: No    Any family history of blood clots: No    Allergies: No known allergies      Current Outpatient Medications:     QUEtiapine (SEROQUEL) 50 MG tablet, , Disp: , Rfl:     VRAYLAR 3 MG CAPS capsule, , Disp: , Rfl:     oxybutynin (DITROPAN XL) 15 MG extended release tablet, , Disp: , Rfl:     aspirin 81 MG EC tablet, Take 81 mg by mouth daily, Disp: , Rfl:     ASPIRIN LOW DOSE 81 MG EC tablet, TAKE 1 TABLET BY MOUTH EVERY DAY, Disp: , Rfl:     atorvastatin (LIPITOR) 10 MG tablet, TAKE 1 TABLET BY MOUTH EVERY DAY, Disp: , Rfl:     busPIRone (BUSPAR) 5 MG tablet, TAKE 1 TABLET BY MOUTH THREE TIMES A DAY, Disp: , Rfl:     famotidine (PEPCID) 20 MG tablet, TAKE 1 TABLET BY MOUTH TWICE A DAY, Disp: , Rfl:     QUEtiapine (SEROQUEL) 25 MG tablet, 75 mg , Disp: , Rfl:     venlafaxine (EFFEXOR XR) 150 MG extended release capsule, TAKE 1 CAPSULE EVERY MORNING, Disp: , Rfl:     venlafaxine (EFFEXOR XR) 75 MG extended release capsule, TAKE 1 CAPSULE BY MOUTH EVERY DAY, Disp: , Rfl:     raNITIdine HCl (CVS RANITIDINE PO), Take by mouth, Disp: , Rfl:     Social History     Substance and Sexual Activity   Sexual Activity Yes    Partners: Male    Birth control/protection: Surgical    Comment: Tubal       Any bleeding or pain with intercourse: No    Last Yearly:  8/2020    Last pap: 9/2020    Last HPV: n/a    Last Mammogram: n/a    Do you do self breast exams: encouraged monthly SBE     Past Medical History:   Diagnosis Date    Anxiety     Depression     Herpes simplex virus (HSV) infection     Pabon syndrome        Past Surgical History:   Procedure Laterality Date    TUBAL LIGATION  2016    TYMPANOSTOMY TUBE PLACEMENT         Family History   Problem Relation Age of Onset    Depression Mother     Cancer Mother     High Blood Pressure Father     High Blood Pressure Maternal Aunt     Stroke Maternal Grandmother     Cancer Maternal Grandmother     Cancer Paternal Grandmother        Chief Complaint   Patient presents with    Gynecologic Exam     Last pap 9/2020. Needs pap every year d/t Pabon Syndrome. PE:  Vital Signs  Blood pressure 123/70, weight 251 lb (113.9 kg), last menstrual period 08/15/2021. Estimated body mass index is 40.51 kg/m² as calculated from the following:    Height as of 8/12/21: 5' 6\" (1.676 m). Weight as of this encounter: 251 lb (113.9 kg). HPI: Patient presents today for annual exam. Feeling well, voices no concerns. Denies breast/pelvic pain. Due for pap, desires STD screening. Reports monthly menses     Review of Systems   All other systems reviewed and are negative.     Objective  Heent:   negative   Cor: regular rate and rhythm, no murmurs              Pul:clear to auscultation bilaterally- no wheezes, rales or rhonchi, normal air movement, no respiratory distress      GI: Abdomen soft, non-tender. BS normal. No masses,  No organomegaly           Extremities: normal strength, tone, and muscle mass, ROM of all joints is normal   Breasts: Breast:normal appearance, no masses or tenderness, No nipple retraction or dimpling, No nipple discharge or bleeding   Pelvic Exam: GENITAL/URINARY:  External Genitalia:  General appearance; normal, Hair distribution; normal, Lesions absent  Urethral Meatus:  Size normal, Location normal, Lesions absent, Prolapse absent  Urethra: Fullness absent, Masses absent  Bladder:  Fullness absent, Masses absent, Tenderness absent, Cystocele absent  Vagina:  General appearance normal, Estrogen effect normal, Discharge absent, Lesions absent, Pelvic support normal  Cervix:  General appearance normal, Lesions absent, Discharge absent, Tenderness absent, Enlargement absent, Nodularity absent  Uterus:  Size normal, Tenderness absent  [place]a: Masses absent, Tenderness absent  Anus/Perineum:  Lesions absent and Masses absent                                    Vaginal discharge: no vaginal discharge  Chaperone: not present                             Assessment and Plan          Diagnosis Orders   1. Women's annual routine gynecological examination  PAP SMEAR   2. Screening for STD (sexually transmitted disease)  C.trachomatis N.gonorrhoeae DNA, Thin Prep   3. Pabno syndrome               I am having Codie Ratliff maintain her raNITIdine HCl (CVS RANITIDINE PO), Aspirin Low Dose, atorvastatin, busPIRone, famotidine, QUEtiapine, venlafaxine, venlafaxine, QUEtiapine, Vraylar, oxybutynin, and aspirin. Return in about 1 year (around 8/30/2022) for yearly. She was also counseled on her preventative health maintenance recommendations and follow-up. There are no Patient Instructions on file for this visit.     Marlyse Scheuermann, APRN - NP,8/31/2021 8:07 AM

## 2021-08-31 DIAGNOSIS — Z11.3 SCREENING FOR STD (SEXUALLY TRANSMITTED DISEASE): ICD-10-CM

## 2021-09-08 LAB — CYTOLOGY REPORT: NORMAL

## 2021-09-22 ENCOUNTER — TELEPHONE (OUTPATIENT)
Dept: OBGYN CLINIC | Age: 31
End: 2021-09-22

## 2021-09-22 NOTE — TELEPHONE ENCOUNTER
Pt called stating she is to have a vaginal usn due to Pabon Syndrome. I did not see a request from a provider for this usn but I may have missed it.  Please let me know what you would like scheduled

## 2021-11-22 ENCOUNTER — HOSPITAL ENCOUNTER (OUTPATIENT)
Age: 31
Setting detail: SPECIMEN
Discharge: HOME | End: 2021-11-22

## 2021-11-22 ENCOUNTER — PROCEDURE VISIT (OUTPATIENT)
Dept: OBGYN CLINIC | Age: 31
End: 2021-11-22
Payer: COMMERCIAL

## 2021-11-22 VITALS
BODY MASS INDEX: 39.66 KG/M2 | WEIGHT: 246.8 LBS | HEIGHT: 66 IN | SYSTOLIC BLOOD PRESSURE: 104 MMHG | DIASTOLIC BLOOD PRESSURE: 74 MMHG

## 2021-11-22 DIAGNOSIS — Z15.09 LYNCH SYNDROME: Primary | ICD-10-CM

## 2021-11-22 PROCEDURE — 58100 BIOPSY OF UTERUS LINING: CPT | Performed by: ADVANCED PRACTICE MIDWIFE

## 2021-11-22 ASSESSMENT — ENCOUNTER SYMPTOMS: RESPIRATORY NEGATIVE: 1

## 2021-11-22 NOTE — PROGRESS NOTES
PROBLEM VISIT     Date of service: 2021    Daron Brown  Is a 28 y.o.  female    PT's PCP is: Manuel Mayo, RIC - CNP     : 1990                                          Review of Systems   Constitutional: Negative. HENT: Negative. Respiratory: Negative. Genitourinary: Negative. Neurological: Negative. Psychiatric/Behavioral: Negative. No LMP recorded.    OB History    Para Term  AB Living   2 2 2     2   SAB IAB Ectopic Molar Multiple Live Births             2      # Outcome Date GA Lbr Holden/2nd Weight Sex Delivery Anes PTL Lv   2 Term 08 40w0d   F Vag-Spont EPI  HEIDE   1 Term  40w0d       HEIDE        Social History     Tobacco Use   Smoking Status Never Smoker   Smokeless Tobacco Never Used        Social History     Substance and Sexual Activity   Alcohol Use No       Allergies: No known allergies      Current Outpatient Medications:     QUEtiapine (SEROQUEL) 50 MG tablet, , Disp: , Rfl:     VRAYLAR 3 MG CAPS capsule, , Disp: , Rfl:     oxybutynin (DITROPAN XL) 15 MG extended release tablet, , Disp: , Rfl:     aspirin 81 MG EC tablet, Take 81 mg by mouth daily, Disp: , Rfl:     ASPIRIN LOW DOSE 81 MG EC tablet, TAKE 1 TABLET BY MOUTH EVERY DAY, Disp: , Rfl:     atorvastatin (LIPITOR) 10 MG tablet, TAKE 1 TABLET BY MOUTH EVERY DAY, Disp: , Rfl:     busPIRone (BUSPAR) 5 MG tablet, TAKE 1 TABLET BY MOUTH THREE TIMES A DAY, Disp: , Rfl:     famotidine (PEPCID) 20 MG tablet, TAKE 1 TABLET BY MOUTH TWICE A DAY, Disp: , Rfl:     QUEtiapine (SEROQUEL) 25 MG tablet, 75 mg , Disp: , Rfl:     venlafaxine (EFFEXOR XR) 150 MG extended release capsule, TAKE 1 CAPSULE EVERY MORNING, Disp: , Rfl:     venlafaxine (EFFEXOR XR) 75 MG extended release capsule, TAKE 1 CAPSULE BY MOUTH EVERY DAY, Disp: , Rfl:     raNITIdine HCl (CVS RANITIDINE PO), Take by mouth, Disp: , Rfl:     Social History     Substance and Sexual Activity   Sexual Activity Yes    Partners: Male    Birth control/protection: Surgical    Comment: Tubal       Chief Complaint   Patient presents with    Procedure     Pt here for endo biopsy. Pt denies any new concerns.  Follow-up     Pt here for USN f/u. Physical Exam  Constitutional:       Appearance: Normal appearance. She is obese. HENT:      Head: Normocephalic. Eyes:      Pupils: Pupils are equal, round, and reactive to light. Pulmonary:      Effort: Pulmonary effort is normal.   Musculoskeletal:         General: Normal range of motion. Cervical back: Normal range of motion. Neurological:      Mental Status: She is alert and oriented to person, place, and time. Skin:     General: Skin is warm and dry. Psychiatric:         Behavior: Behavior normal.   Vitals and nursing note reviewed. Vital Signs  Blood pressure 104/74, height 5' 6\" (1.676 m), weight 246 lb 12.8 oz (111.9 kg). HPI  Has hx of ennis syndrome - was educated regarding yearly USN and endometrial biopsy. Patient had USN today. Has regular menses but unsure of LMP. Has had tubal ligation                      Results reviewed today:    USN today  Heterogenous uterus 9.0 x 5.0 x 4.5cm  Endometrium 10.0mm  Ovaries appear normal      EMB: performed today - procedure explained to pt - verbal consent obtained     - large speculum paced in vaginal canal  - cervix visualized  - cervix prepped with betadine solution  - hurricane spray  was not used  - tenaculum was not used  - os finder used and uterus sounded to 10 cm  - pipette used to obtain specimen  - pt tolerated procedure well. Assessment and Plan          Diagnosis Orders   1. Ennis syndrome  Surgical Pathology     Encouraged to schedule for yearly visits  Aware we will call if abn results. I am having Codie YAMILKA Ratliff maintain her raNITIdine HCl (CVS RANITIDINE PO), Aspirin Low Dose, atorvastatin, busPIRone, famotidine, QUEtiapine, venlafaxine, venlafaxine, QUEtiapine, Vraylar, oxybutynin, and aspirin. Return yearly USN and endo bx with annual exams also. She was also counseled on her preventative health maintenance recommendations and follow-up. There are no Patient Instructions on file for this visit.     RIC Norris CNM,11/22/2021 4:00 PM                     Electronically signed by RIC Norris CNM

## 2021-11-24 LAB — SURGICAL PATHOLOGY REPORT: NORMAL

## 2022-09-20 ENCOUNTER — HOSPITAL ENCOUNTER (OUTPATIENT)
Age: 32
Setting detail: SPECIMEN
Discharge: HOME | End: 2022-09-20

## 2022-09-20 ENCOUNTER — OFFICE VISIT (OUTPATIENT)
Dept: OBGYN CLINIC | Age: 32
End: 2022-09-20
Payer: COMMERCIAL

## 2022-09-20 VITALS
DIASTOLIC BLOOD PRESSURE: 81 MMHG | SYSTOLIC BLOOD PRESSURE: 118 MMHG | BODY MASS INDEX: 39.7 KG/M2 | HEIGHT: 66 IN | WEIGHT: 247 LBS

## 2022-09-20 DIAGNOSIS — Z15.09 LYNCH SYNDROME: ICD-10-CM

## 2022-09-20 DIAGNOSIS — Z12.4 SCREENING FOR CERVICAL CANCER: ICD-10-CM

## 2022-09-20 DIAGNOSIS — Z11.3 SCREENING EXAMINATION FOR STD (SEXUALLY TRANSMITTED DISEASE): ICD-10-CM

## 2022-09-20 DIAGNOSIS — Z01.419 WOMEN'S ANNUAL ROUTINE GYNECOLOGICAL EXAMINATION: Primary | ICD-10-CM

## 2022-09-20 PROCEDURE — 99395 PREV VISIT EST AGE 18-39: CPT | Performed by: NURSE PRACTITIONER

## 2022-09-20 PROCEDURE — 58100 BIOPSY OF UTERUS LINING: CPT | Performed by: NURSE PRACTITIONER

## 2022-09-20 RX ORDER — OMEPRAZOLE 20 MG/1
CAPSULE, DELAYED RELEASE ORAL
COMMUNITY
Start: 2022-08-31

## 2022-09-20 RX ORDER — DOCUSATE SODIUM 100 MG/1
CAPSULE, LIQUID FILLED ORAL
COMMUNITY
Start: 2022-08-23

## 2022-09-20 NOTE — PROGRESS NOTES
YEARLY PHYSICAL    Date of service: 2022    Lola Kasper  Is a 28 y.o.  female    PT's PCP is: Fabienne Palomino, APRN - CNP     : 1990                                         Chaperone for Intimate Exam  Chaperone was offered as part of the rooming process. Patient declined and agrees to continue with exam without a chaperone. Chaperone: n/a      Subjective:       Patient's last menstrual period was 2022 (approximate).      Are your menses regular: monthly     OB History    Para Term  AB Living   2 2 2     2   SAB IAB Ectopic Molar Multiple Live Births             2      # Outcome Date GA Lbr Holden/2nd Weight Sex Delivery Anes PTL Lv   2 Term 08 40w0d   F Vag-Spont EPI  HEIDE   1 Term  40w0d       HEIDE        Social History     Tobacco Use   Smoking Status Never   Smokeless Tobacco Never        Social History     Substance and Sexual Activity   Alcohol Use No       Family History   Problem Relation Age of Onset    Depression Mother     Cancer Mother     Migraines Mother     High Blood Pressure Father     Hypertension Father     Mental Illness Father     High Blood Pressure Maternal Aunt     Stroke Maternal Grandmother     Cancer Maternal Grandmother     Cancer Paternal Grandmother     Colon Cancer Paternal Grandmother     Heart Surgery Maternal Grandfather        Any family history of breast or ovarian cancer: No    Any family history of blood clots: No      Allergies: No known allergies      Current Outpatient Medications:     omeprazole (PRILOSEC) 20 MG delayed release capsule, TAKE 1 CAPSULE BY MOUTH EVERY DAY 30 MINUTES BEFORE MORNING MEAL FOR 30 DAYS, Disp: , Rfl:     docusate sodium (COLACE) 100 MG capsule, TAKE 1 CAPSULE BY MOUTH EVERY DAY, Disp: , Rfl:     QUEtiapine (SEROQUEL) 50 MG tablet, , Disp: , Rfl:     VRAYLAR 3 MG CAPS capsule, , Disp: , Rfl:     oxybutynin (DITROPAN XL) 15 MG extended release tablet, , Disp: , Rfl:     aspirin 81 MG EC tablet, Take 81 mg by mouth daily, Disp: , Rfl:     ASPIRIN LOW DOSE 81 MG EC tablet, TAKE 1 TABLET BY MOUTH EVERY DAY, Disp: , Rfl:     atorvastatin (LIPITOR) 10 MG tablet, TAKE 1 TABLET BY MOUTH EVERY DAY, Disp: , Rfl:     busPIRone (BUSPAR) 5 MG tablet, TAKE 1 TABLET BY MOUTH THREE TIMES A DAY, Disp: , Rfl:     QUEtiapine (SEROQUEL) 25 MG tablet, 75 mg , Disp: , Rfl:     venlafaxine (EFFEXOR XR) 150 MG extended release capsule, TAKE 1 CAPSULE EVERY MORNING, Disp: , Rfl:     venlafaxine (EFFEXOR XR) 75 MG extended release capsule, TAKE 1 CAPSULE BY MOUTH EVERY DAY, Disp: , Rfl:     raNITIdine HCl (CVS RANITIDINE PO), Take by mouth, Disp: , Rfl:     Social History     Substance and Sexual Activity   Sexual Activity Yes    Partners: Male    Birth control/protection: Surgical    Comment: Tubal       Any bleeding or pain with intercourse: No    Last Yearly:  8/2021    Last pap: 8/2021    Last HPV: n/a    Last Mammogram: n/a    Last Dexascan n/a    Do you do self breast exams: encouraged     Past Medical History:   Diagnosis Date    Anxiety     Depression     Herpes simplex virus (HSV) infection     Pabon syndrome        Past Surgical History:   Procedure Laterality Date    TUBAL LIGATION  2016    TYMPANOSTOMY TUBE PLACEMENT         Family History   Problem Relation Age of Onset    Depression Mother     Cancer Mother     Migraines Mother     High Blood Pressure Father     Hypertension Father     Mental Illness Father     High Blood Pressure Maternal Aunt     Stroke Maternal Grandmother     Cancer Maternal Grandmother     Cancer Paternal Grandmother     Colon Cancer Paternal Grandmother     Heart Surgery Maternal Grandfather        Chief Complaint   Patient presents with    Annual Exam     Last pap 8/30/21. Follow up usn and needs endo bx.            PE:  Vital Signs  Blood pressure 118/81, height 5' 6\" (1.676 m), weight 247 lb (112 kg), last menstrual period 09/06/2022. Estimated body mass index is 39.87 kg/m² as calculated from the following:    Height as of this encounter: 5' 6\" (1.676 m). Weight as of this encounter: 247 lb (112 kg). HPI: Patient presents today for annual exam, usn review and endo bx. Feeling well, voices no concerns. Denies breast/pelvic pain. Negative pap 8/2021. Desires STD screening. Gets yearly pap and endometrial bx due to Pabon syndrome. Mammogram not indicated at this time. Wellness reviewed. Reports monthly menses. Review of Systems   Constitutional:  Negative for chills, fatigue and fever. Respiratory:  Negative for shortness of breath. Cardiovascular:  Negative for chest pain. Gastrointestinal:  Negative for abdominal pain, constipation and diarrhea. Genitourinary:  Negative for difficulty urinating, dyspareunia, dysuria, enuresis, frequency, menstrual problem, pelvic pain, urgency, vaginal bleeding, vaginal discharge and vaginal pain. Neurological:  Negative for dizziness, light-headedness and headaches. Physical Exam  Constitutional:       General: She is not in acute distress. Appearance: Normal appearance. She is obese. She is not ill-appearing. Genitourinary:      Vulva, bladder and urethral meatus normal.      No lesions in the vagina. Right Labia: No rash or lesions. Left Labia: No lesions or rash. No vaginal discharge. No vaginal prolapse present. No vaginal atrophy present. Right Adnexa: not tender and no mass present. Left Adnexa: not tender and no mass present. No cervical motion tenderness, discharge or friability. No parametrium nodularity present. Uterus is not enlarged or tender. No uterine mass detected. No urethral prolapse, tenderness or mass present. Breasts:     Right: No mass, nipple discharge, skin change or tenderness. Left: No mass, nipple discharge, skin change or tenderness.    HENT:      Head: Normocephalic and atraumatic. Eyes:      Extraocular Movements: Extraocular movements intact. Pupils: Pupils are equal, round, and reactive to light. Cardiovascular:      Rate and Rhythm: Normal rate. Pulmonary:      Effort: Pulmonary effort is normal.   Abdominal:      Palpations: Abdomen is soft. Tenderness: There is no abdominal tenderness. There is no guarding or rebound. Musculoskeletal:         General: Normal range of motion. Neurological:      General: No focal deficit present. Mental Status: She is alert and oriented to person, place, and time. Skin:     General: Skin is warm and dry. Psychiatric:         Mood and Affect: Mood normal.         Behavior: Behavior normal.     Heterogeneous appearing uterus measures: 8.7 x 4.0  x 4.8 cm   Endometrium measures: 13.6 mm   Right ovary appears WNL   Left ovary appears WNL     Reason For Biopsy: Pabon syndrome     EMB: performed today - procedure explained to pt - verbal consent obtained, patient desires UPT prior to procedure. Hx of Tubal ligation     Procedure:      A speculum was paced in vaginal canal and the cervix was visualized. The cervix was then prepped with betadine x 3. A tenaculum was used. The uterus was measured to be 8 cm. Pipette used to obtain specimen and the specimen will be sent for pathology. A moderate amount of tissue was collected. Pt tolerated the procedure well. Assessment and Plan          Diagnosis Orders   1. Women's annual routine gynecological examination  PAP SMEAR      2. Screening examination for STD (sexually transmitted disease)  C.trachomatis N.gonorrhoeae DNA, Thin Prep      3. Screening for cervical cancer  PAP SMEAR      4. Pabon syndrome  Surgical Pathology          Repeat Annual every 1 year  Cervical Cytology Evaluation begins at 24years old. If Negative Cytology, Follow-up screening per current guidelines. Mammograms every 1year. If 37 yo and last mammogram was negative.   Routine healthmaintenance per patients PCP. will call with results of endo bx. I have discontinued Codie Ratliff's famotidine. I am also having her maintain her raNITIdine HCl (CVS RANITIDINE PO), Aspirin Low Dose, atorvastatin, busPIRone, QUEtiapine, venlafaxine, venlafaxine, QUEtiapine, Vraylar, oxybutynin, aspirin, omeprazole, and docusate sodium. Return in about 1 year (around 9/20/2023) for yearly. She was also counseled on her preventative health maintenance recommendations and follow-up. There are no Patient Instructions on file for this visit.     IRC Howard NP,9/26/2022 9:06 AM

## 2022-09-22 LAB
CHLAMYDIA BY THIN PREP: NEGATIVE
CYTOLOGY REPORT: NORMAL
HPV SAMPLE: NORMAL
HPV, GENOTYPE 16: NOT DETECTED
HPV, GENOTYPE 18: NOT DETECTED
HPV, HIGH RISK OTHER: NOT DETECTED
HPV, INTERPRETATION: NORMAL
N. GONORRHOEAE DNA, THIN PREP: NEGATIVE
SPECIMEN DESCRIPTION: NORMAL
SPECIMEN DESCRIPTION: NORMAL
SURGICAL PATHOLOGY REPORT: NORMAL

## 2022-09-26 ASSESSMENT — ENCOUNTER SYMPTOMS
CONSTIPATION: 0
SHORTNESS OF BREATH: 0
ABDOMINAL PAIN: 0
DIARRHEA: 0

## 2023-03-30 ENCOUNTER — HOSPITAL ENCOUNTER (OUTPATIENT)
Age: 33
Setting detail: SPECIMEN
Discharge: HOME | End: 2023-03-30

## 2023-03-30 ENCOUNTER — OFFICE VISIT (OUTPATIENT)
Dept: OBGYN CLINIC | Age: 33
End: 2023-03-30
Payer: COMMERCIAL

## 2023-03-30 VITALS
BODY MASS INDEX: 39.57 KG/M2 | HEIGHT: 66 IN | SYSTOLIC BLOOD PRESSURE: 125 MMHG | WEIGHT: 246.2 LBS | DIASTOLIC BLOOD PRESSURE: 78 MMHG

## 2023-03-30 DIAGNOSIS — Z20.2 POSSIBLE EXPOSURE TO STD: ICD-10-CM

## 2023-03-30 DIAGNOSIS — Z20.2 POSSIBLE EXPOSURE TO STD: Primary | ICD-10-CM

## 2023-03-30 PROCEDURE — G8484 FLU IMMUNIZE NO ADMIN: HCPCS | Performed by: NURSE PRACTITIONER

## 2023-03-30 PROCEDURE — 99213 OFFICE O/P EST LOW 20 MIN: CPT | Performed by: NURSE PRACTITIONER

## 2023-03-30 PROCEDURE — G8427 DOCREV CUR MEDS BY ELIG CLIN: HCPCS | Performed by: NURSE PRACTITIONER

## 2023-03-30 PROCEDURE — 1036F TOBACCO NON-USER: CPT | Performed by: NURSE PRACTITIONER

## 2023-03-30 PROCEDURE — G8417 CALC BMI ABV UP PARAM F/U: HCPCS | Performed by: NURSE PRACTITIONER

## 2023-03-30 RX ORDER — VIBEGRON 75 MG/1
TABLET, FILM COATED ORAL
COMMUNITY
Start: 2023-03-20

## 2023-03-30 ASSESSMENT — ENCOUNTER SYMPTOMS: RESPIRATORY NEGATIVE: 1

## 2023-03-30 NOTE — PROGRESS NOTES
PROBLEM VISIT     Date of service: 3/30/2023    Corina Stauffer  Is a 33 y.o. female    PT's PCP is: Sriram King, RIC - CNP     : 1990                                             Subjective:       Patient's last menstrual period was 03/15/2023 (approximate).    OB History    Para Term  AB Living   2 2 2     2   SAB IAB Ectopic Molar Multiple Live Births             2      # Outcome Date GA Lbr Holden/2nd Weight Sex Delivery Anes PTL Lv   2 Term 08 40w0d   F Vag-Spont EPI  HEIDE   1 Term  40w0d       HEIDE        Social History     Tobacco Use   Smoking Status Never   Smokeless Tobacco Never        Social History     Substance and Sexual Activity   Alcohol Use No       Allergies: No known allergies      Current Outpatient Medications:     GEMTESA 75 MG TABS tablet, TAKE 1 TABLET BY MOUTH EVERY DAY FOR 30 DAYS, Disp: , Rfl:     omeprazole (PRILOSEC) 20 MG delayed release capsule, TAKE 1 CAPSULE BY MOUTH EVERY DAY 30 MINUTES BEFORE MORNING MEAL FOR 30 DAYS, Disp: , Rfl:     docusate sodium (COLACE) 100 MG capsule, TAKE 1 CAPSULE BY MOUTH EVERY DAY, Disp: , Rfl:     QUEtiapine (SEROQUEL) 50 MG tablet, , Disp: , Rfl:     VRAYLAR 3 MG CAPS capsule, , Disp: , Rfl:     oxybutynin (DITROPAN XL) 15 MG extended release tablet, , Disp: , Rfl:     ASPIRIN LOW DOSE 81 MG EC tablet, TAKE 1 TABLET BY MOUTH EVERY DAY, Disp: , Rfl:     atorvastatin (LIPITOR) 10 MG tablet, TAKE 1 TABLET BY MOUTH EVERY DAY, Disp: , Rfl:     busPIRone (BUSPAR) 5 MG tablet, TAKE 1 TABLET BY MOUTH THREE TIMES A DAY, Disp: , Rfl:     QUEtiapine (SEROQUEL) 25 MG tablet, 75 mg , Disp: , Rfl:     venlafaxine (EFFEXOR XR) 150 MG extended release capsule, TAKE 1 CAPSULE EVERY MORNING, Disp: , Rfl:     venlafaxine (EFFEXOR XR) 75 MG extended release capsule, TAKE 1 CAPSULE BY MOUTH EVERY DAY, Disp: , Rfl:     raNITIdine HCl ([facility] RANITIDINE PO), Take by mouth, Disp: , Rfl:     Social History     Substance and Sexual Activity

## 2023-03-31 LAB
C TRACH DNA SPEC QL PROBE+SIG AMP: NEGATIVE
CANDIDA SPECIES, DNA PROBE: NEGATIVE
GARDNERELLA VAGINALIS, DNA PROBE: POSITIVE
N GONORRHOEA DNA SPEC QL PROBE+SIG AMP: NEGATIVE
SOURCE: ABNORMAL
SPECIMEN DESCRIPTION: NORMAL
TRICHOMONAS VAGINALIS DNA: NEGATIVE

## 2023-09-11 ENCOUNTER — OFFICE VISIT (OUTPATIENT)
Dept: OBGYN CLINIC | Age: 33
End: 2023-09-11
Payer: COMMERCIAL

## 2023-09-11 ENCOUNTER — HOSPITAL ENCOUNTER (OUTPATIENT)
Age: 33
Setting detail: SPECIMEN
Discharge: HOME | End: 2023-09-11

## 2023-09-11 VITALS
HEIGHT: 66 IN | SYSTOLIC BLOOD PRESSURE: 122 MMHG | WEIGHT: 240.6 LBS | DIASTOLIC BLOOD PRESSURE: 80 MMHG | BODY MASS INDEX: 38.67 KG/M2

## 2023-09-11 DIAGNOSIS — Z20.2 POSSIBLE EXPOSURE TO STD: ICD-10-CM

## 2023-09-11 DIAGNOSIS — Z01.419 WOMEN'S ANNUAL ROUTINE GYNECOLOGICAL EXAMINATION: Primary | ICD-10-CM

## 2023-09-11 DIAGNOSIS — Z15.09 LYNCH SYNDROME: ICD-10-CM

## 2023-09-11 PROCEDURE — 99395 PREV VISIT EST AGE 18-39: CPT | Performed by: NURSE PRACTITIONER

## 2023-09-11 ASSESSMENT — ENCOUNTER SYMPTOMS
DIARRHEA: 0
SHORTNESS OF BREATH: 0
ABDOMINAL PAIN: 0
CONSTIPATION: 0

## 2023-09-11 NOTE — PROGRESS NOTES
YEARLY PHYSICAL    Date of service: 2023    Brittany Shipley  Is a 33 y.o. female    PT's PCP is: RIC Yepez - LEONARD     : 1990                                         Chaperone for Intimate Exam  Chaperone was offered as part of the rooming process. Patient declined and agrees to continue with exam without a chaperone. Chaperone: n/a      Subjective:       Patient's last menstrual period was 2023 (approximate).      Are your menses regular: yes    OB History    Para Term  AB Living   2 2 2     2   SAB IAB Ectopic Molar Multiple Live Births             2      # Outcome Date GA Lbr Holden/2nd Weight Sex Delivery Anes PTL Lv   2 Term 08 40w0d   F Vag-Spont EPI  HEIDE   1 Term  40w0d       HEIDE        Social History     Tobacco Use   Smoking Status Never   Smokeless Tobacco Never        Social History     Substance and Sexual Activity   Alcohol Use No       Family History   Problem Relation Age of Onset    Depression Mother     Cancer Mother     Migraines Mother     Brain Cancer Mother     High Blood Pressure Father     Hypertension Father     Mental Illness Father     Stroke Maternal Grandmother     Cancer Maternal Grandmother     Colon Cancer Maternal Grandmother     Esophageal Cancer Maternal Grandmother     Heart Surgery Maternal Grandfather     Cancer Paternal Grandmother     Colon Cancer Paternal Grandmother     High Blood Pressure Maternal Aunt     Colon Cancer Maternal Aunt        Any family history of breast or ovarian cancer: No    Any family history of blood clots: No      Allergies: No known allergies      Current Outpatient Medications:     GEMTESA 75 MG TABS tablet, TAKE 1 TABLET BY MOUTH EVERY DAY FOR 30 DAYS, Disp: , Rfl:     omeprazole (PRILOSEC) 20 MG delayed release capsule, TAKE 1 CAPSULE BY MOUTH EVERY DAY 30 MINUTES BEFORE MORNING MEAL FOR 30 DAYS, Disp: , Rfl:     docusate sodium

## 2023-09-12 DIAGNOSIS — Z20.2 POSSIBLE EXPOSURE TO STD: ICD-10-CM

## 2023-09-13 LAB
C TRACH DNA SPEC QL PROBE+SIG AMP: NEGATIVE
N GONORRHOEA DNA SPEC QL PROBE+SIG AMP: NEGATIVE
SPECIMEN DESCRIPTION: NORMAL

## 2023-09-14 LAB
HPV I/H RISK 4 DNA CVX QL NAA+PROBE: NOT DETECTED
HPV SAMPLE: NORMAL
HPV, INTERPRETATION: NORMAL
HPV16 DNA CVX QL NAA+PROBE: NOT DETECTED
HPV18 DNA CVX QL NAA+PROBE: NOT DETECTED
SPECIMEN DESCRIPTION: NORMAL

## 2023-09-21 LAB — CYTOLOGY REPORT: NORMAL

## 2023-10-17 ENCOUNTER — TELEPHONE (OUTPATIENT)
Dept: OBGYN CLINIC | Age: 33
End: 2023-10-17

## 2023-10-17 DIAGNOSIS — Z15.09 LYNCH SYNDROME: Primary | ICD-10-CM

## 2023-10-17 NOTE — TELEPHONE ENCOUNTER
Pt would like her lab order for SPT to be sent to [facility]. She is scheduled for bx 10-24 and will do the lab the day before.

## 2023-12-11 ENCOUNTER — HOSPITAL ENCOUNTER (EMERGENCY)
Age: 33
Discharge: HOME | End: 2023-12-11
Attending: EMERGENCY MEDICINE
Payer: COMMERCIAL

## 2023-12-11 ENCOUNTER — APPOINTMENT (OUTPATIENT)
Dept: GENERAL RADIOLOGY | Age: 33
End: 2023-12-11
Payer: COMMERCIAL

## 2023-12-11 VITALS
HEIGHT: 66 IN | RESPIRATION RATE: 16 BRPM | OXYGEN SATURATION: 100 % | HEART RATE: 85 BPM | DIASTOLIC BLOOD PRESSURE: 84 MMHG | TEMPERATURE: 99.1 F | BODY MASS INDEX: 38.57 KG/M2 | WEIGHT: 240 LBS | SYSTOLIC BLOOD PRESSURE: 145 MMHG

## 2023-12-11 DIAGNOSIS — S90.32XA CONTUSION OF LEFT FOOT, INITIAL ENCOUNTER: Primary | ICD-10-CM

## 2023-12-11 PROCEDURE — 99283 EMERGENCY DEPT VISIT LOW MDM: CPT

## 2023-12-11 PROCEDURE — 73630 X-RAY EXAM OF FOOT: CPT

## 2023-12-11 ASSESSMENT — LIFESTYLE VARIABLES
HOW OFTEN DO YOU HAVE A DRINK CONTAINING ALCOHOL: NEVER
HOW MANY STANDARD DRINKS CONTAINING ALCOHOL DO YOU HAVE ON A TYPICAL DAY: PATIENT DOES NOT DRINK

## 2023-12-11 ASSESSMENT — PAIN DESCRIPTION - ORIENTATION: ORIENTATION: LEFT

## 2023-12-11 ASSESSMENT — PAIN SCALES - GENERAL: PAINLEVEL_OUTOF10: 6

## 2023-12-11 ASSESSMENT — PAIN DESCRIPTION - LOCATION: LOCATION: FOOT

## 2023-12-11 NOTE — DISCHARGE INSTRUCTIONS
Please apply ice over the affected area of your foot tonight  You may take ibuprofen/Tylenol as needed for pain  If having intractable pain or spreading redness or fever or chills return to ER  Otherwise please consult with your primary care physician for further care and recommendation

## 2023-12-11 NOTE — ED NOTES
Patient discharging home, AVS reviewed with no questions at this time. Patient instrcuted to follow up per discharge instructions and to return for worsening symptoms. Respirations equal and unlabored, skin PWD.        Haydee Naik RN  12/11/23 [id number]

## 2023-12-11 NOTE — ED PROVIDER NOTES
Emergency Department Encounter    Patient: Yvonne Tovar  MRN: [id number]  : 1990  Date of Evaluation: 2023  ED Provider:  Francis Figueroa MD    Triage Chief Complaint:   Foot Injury (left)    [place]:  Yvonne Tovar is a 29 y.o. female who is morbidly obese with history of anxiety, depression and Pabon syndrome that presents status post fall injury to the dorsum of the left foot. Patient states she was walking down steps when she lost her balance 2 hours ago and awkwardly landed on the left foot. States that she has been having significant pain. Patient states she is having difficult been able to apply weight on the foot due to pain. She does not admit to numbness or tingling. Patient denies bleeding disorder or being anticoagulated. She denies numbness or tingling or weakness in the extremity or the foot. No ankle, leg, knee, thigh or hip pain.   No other injuries or complaints of pain reported by the patient    ROS - see HPI, below listed is current ROS at time of my eval:  General:  No fevers, no chills, no weakness  Eyes:  No recent vison changes, no discharge  ENT:  No sore throat, no nasal congestion, no hearing changes  Cardiovascular:  No chest pain, no palpitations  Respiratory:  No shortness of breath, no cough, no wheezing  Gastrointestinal:  No pain, no nausea, no vomiting, no diarrhea  Musculoskeletal: Acute pain dorsum left foot with swelling status post fall  Skin:  No rash, no pruritis, no easy bruising  Neurologic:  No speech problems, no headache, no extremity numbness, no extremity tingling, no extremity weakness  Psychiatric:  No anxiety  Genitourinary:  No dysuria, no hematuria  Endocrine:  No unexpected weight gain, no unexpected weight loss  Extremities:  no edema, no pain    Past Medical History:   Diagnosis Date    Anxiety     Depression     Herpes simplex virus (HSV) infection     Pabon syndrome      Past Surgical History:   Procedure Laterality Date    TUBAL

## 2024-09-09 ENCOUNTER — HOSPITAL ENCOUNTER (EMERGENCY)
Age: 34
Discharge: HOME | End: 2024-09-09
Attending: EMERGENCY MEDICINE
Payer: COMMERCIAL

## 2024-09-09 VITALS
DIASTOLIC BLOOD PRESSURE: 91 MMHG | TEMPERATURE: 98.8 F | OXYGEN SATURATION: 94 % | WEIGHT: 235 LBS | HEART RATE: 86 BPM | RESPIRATION RATE: 16 BRPM | BODY MASS INDEX: 37.93 KG/M2 | SYSTOLIC BLOOD PRESSURE: 126 MMHG

## 2024-09-09 DIAGNOSIS — B00.9 HERPES SIMPLEX: ICD-10-CM

## 2024-09-09 DIAGNOSIS — L29.9 PRURITIC DISORDER: Primary | ICD-10-CM

## 2024-09-09 PROCEDURE — 99283 EMERGENCY DEPT VISIT LOW MDM: CPT

## 2024-09-09 PROCEDURE — 6370000000 HC RX 637 (ALT 250 FOR IP): Performed by: EMERGENCY MEDICINE

## 2024-09-09 RX ORDER — HYDROXYZINE HYDROCHLORIDE 25 MG/1
25 TABLET, FILM COATED ORAL ONCE
Status: COMPLETED | OUTPATIENT
Start: 2024-09-09 | End: 2024-09-09

## 2024-09-09 RX ORDER — HYDROXYZINE HYDROCHLORIDE 25 MG/1
25 TABLET, FILM COATED ORAL EVERY 8 HOURS PRN
Qty: 30 TABLET | Refills: 0 | Status: SHIPPED | OUTPATIENT
Start: 2024-09-09

## 2024-09-09 RX ADMIN — HYDROXYZINE HYDROCHLORIDE 25 MG: 25 TABLET, FILM COATED ORAL at 17:31

## 2024-09-09 ASSESSMENT — PAIN - FUNCTIONAL ASSESSMENT: PAIN_FUNCTIONAL_ASSESSMENT: NONE - DENIES PAIN
